# Patient Record
Sex: FEMALE | Race: WHITE | NOT HISPANIC OR LATINO | Employment: UNEMPLOYED | URBAN - METROPOLITAN AREA
[De-identification: names, ages, dates, MRNs, and addresses within clinical notes are randomized per-mention and may not be internally consistent; named-entity substitution may affect disease eponyms.]

---

## 2023-04-26 ENCOUNTER — TRANSCRIBE ORDERS (OUTPATIENT)
Dept: ADMINISTRATIVE | Facility: HOSPITAL | Age: 53
End: 2023-04-26
Payer: COMMERCIAL

## 2023-04-26 DIAGNOSIS — Z12.31 ENCOUNTER FOR SCREENING MAMMOGRAM FOR MALIGNANT NEOPLASM OF BREAST: Primary | ICD-10-CM

## 2023-04-30 ENCOUNTER — APPOINTMENT (OUTPATIENT)
Dept: CT IMAGING | Facility: HOSPITAL | Age: 53
End: 2023-04-30
Payer: COMMERCIAL

## 2023-04-30 ENCOUNTER — APPOINTMENT (OUTPATIENT)
Dept: GENERAL RADIOLOGY | Facility: HOSPITAL | Age: 53
End: 2023-04-30
Payer: COMMERCIAL

## 2023-04-30 ENCOUNTER — HOSPITAL ENCOUNTER (EMERGENCY)
Facility: HOSPITAL | Age: 53
Discharge: HOME | End: 2023-04-30
Attending: EMERGENCY MEDICINE | Admitting: EMERGENCY MEDICINE
Payer: COMMERCIAL

## 2023-04-30 VITALS
SYSTOLIC BLOOD PRESSURE: 155 MMHG | BODY MASS INDEX: 34.22 KG/M2 | RESPIRATION RATE: 17 BRPM | WEIGHT: 218.03 LBS | HEIGHT: 67 IN | TEMPERATURE: 98 F | DIASTOLIC BLOOD PRESSURE: 82 MMHG | HEART RATE: 74 BPM | OXYGEN SATURATION: 97 %

## 2023-04-30 DIAGNOSIS — F41.0 PANIC: ICD-10-CM

## 2023-04-30 DIAGNOSIS — R20.2 PARESTHESIA: Primary | ICD-10-CM

## 2023-04-30 LAB
ALBUMIN SERPL-MCNC: 4.6 G/DL (ref 3.5–5.2)
ALBUMIN/GLOB SERPL: 1.4 G/DL
ALP SERPL-CCNC: 73 U/L (ref 39–117)
ALT SERPL W P-5'-P-CCNC: 39 U/L (ref 1–33)
ANION GAP SERPL CALCULATED.3IONS-SCNC: 9.6 MMOL/L (ref 5–15)
AST SERPL-CCNC: 24 U/L (ref 1–32)
BASOPHILS # BLD AUTO: 0.07 10*3/MM3 (ref 0–0.2)
BASOPHILS NFR BLD AUTO: 0.8 % (ref 0–1.5)
BILIRUB SERPL-MCNC: 0.2 MG/DL (ref 0–1.2)
BUN SERPL-MCNC: 11 MG/DL (ref 6–20)
BUN/CREAT SERPL: 10.9 (ref 7–25)
CALCIUM SPEC-SCNC: 9.8 MG/DL (ref 8.6–10.5)
CHLORIDE SERPL-SCNC: 102 MMOL/L (ref 98–107)
CO2 SERPL-SCNC: 26.4 MMOL/L (ref 22–29)
CREAT SERPL-MCNC: 1.01 MG/DL (ref 0.57–1)
DEPRECATED RDW RBC AUTO: 39.2 FL (ref 37–54)
EGFRCR SERPLBLD CKD-EPI 2021: 66.7 ML/MIN/1.73
EOSINOPHIL # BLD AUTO: 0.17 10*3/MM3 (ref 0–0.4)
EOSINOPHIL NFR BLD AUTO: 1.8 % (ref 0.3–6.2)
ERYTHROCYTE [DISTWIDTH] IN BLOOD BY AUTOMATED COUNT: 12.1 % (ref 12.3–15.4)
GLOBULIN UR ELPH-MCNC: 3.2 GM/DL
GLUCOSE SERPL-MCNC: 108 MG/DL (ref 65–99)
HCT VFR BLD AUTO: 41 % (ref 34–46.6)
HGB BLD-MCNC: 13.9 G/DL (ref 12–15.9)
HOLD SPECIMEN: NORMAL
HOLD SPECIMEN: NORMAL
IMM GRANULOCYTES # BLD AUTO: 0.02 10*3/MM3 (ref 0–0.05)
IMM GRANULOCYTES NFR BLD AUTO: 0.2 % (ref 0–0.5)
LYMPHOCYTES # BLD AUTO: 4.16 10*3/MM3 (ref 0.7–3.1)
LYMPHOCYTES NFR BLD AUTO: 44.6 % (ref 19.6–45.3)
MAGNESIUM SERPL-MCNC: 2.1 MG/DL (ref 1.6–2.6)
MCH RBC QN AUTO: 30.2 PG (ref 26.6–33)
MCHC RBC AUTO-ENTMCNC: 33.9 G/DL (ref 31.5–35.7)
MCV RBC AUTO: 89.1 FL (ref 79–97)
MONOCYTES # BLD AUTO: 0.64 10*3/MM3 (ref 0.1–0.9)
MONOCYTES NFR BLD AUTO: 6.9 % (ref 5–12)
NEUTROPHILS NFR BLD AUTO: 4.27 10*3/MM3 (ref 1.7–7)
NEUTROPHILS NFR BLD AUTO: 45.7 % (ref 42.7–76)
NRBC BLD AUTO-RTO: 0 /100 WBC (ref 0–0.2)
PLATELET # BLD AUTO: 258 10*3/MM3 (ref 140–450)
PMV BLD AUTO: 8.8 FL (ref 6–12)
POTASSIUM SERPL-SCNC: 4.2 MMOL/L (ref 3.5–5.2)
PROT SERPL-MCNC: 7.8 G/DL (ref 6–8.5)
RBC # BLD AUTO: 4.6 10*6/MM3 (ref 3.77–5.28)
SODIUM SERPL-SCNC: 138 MMOL/L (ref 136–145)
TROPONIN T SERPL HS-MCNC: <6 NG/L
WBC NRBC COR # BLD: 9.33 10*3/MM3 (ref 3.4–10.8)
WHOLE BLOOD HOLD COAG: NORMAL
WHOLE BLOOD HOLD SPECIMEN: NORMAL

## 2023-04-30 PROCEDURE — 99283 EMERGENCY DEPT VISIT LOW MDM: CPT

## 2023-04-30 PROCEDURE — 93005 ELECTROCARDIOGRAM TRACING: CPT

## 2023-04-30 PROCEDURE — 93005 ELECTROCARDIOGRAM TRACING: CPT | Performed by: EMERGENCY MEDICINE

## 2023-04-30 PROCEDURE — 99222 1ST HOSP IP/OBS MODERATE 55: CPT | Performed by: PSYCHIATRY & NEUROLOGY

## 2023-04-30 PROCEDURE — 70450 CT HEAD/BRAIN W/O DYE: CPT

## 2023-04-30 PROCEDURE — 84484 ASSAY OF TROPONIN QUANT: CPT

## 2023-04-30 PROCEDURE — 85025 COMPLETE CBC W/AUTO DIFF WBC: CPT

## 2023-04-30 PROCEDURE — 71045 X-RAY EXAM CHEST 1 VIEW: CPT

## 2023-04-30 PROCEDURE — 36415 COLL VENOUS BLD VENIPUNCTURE: CPT

## 2023-04-30 PROCEDURE — 83735 ASSAY OF MAGNESIUM: CPT

## 2023-04-30 PROCEDURE — 80053 COMPREHEN METABOLIC PANEL: CPT

## 2023-04-30 RX ORDER — SODIUM CHLORIDE 0.9 % (FLUSH) 0.9 %
10 SYRINGE (ML) INJECTION AS NEEDED
Status: DISCONTINUED | OUTPATIENT
Start: 2023-04-30 | End: 2023-04-30 | Stop reason: HOSPADM

## 2023-04-30 RX ORDER — PAROXETINE 10 MG/1
1 TABLET, FILM COATED ORAL DAILY
COMMUNITY
Start: 2023-04-20

## 2023-04-30 RX ORDER — LEVOTHYROXINE SODIUM 0.07 MG/1
75 TABLET ORAL EVERY MORNING
COMMUNITY
Start: 2023-04-20

## 2023-04-30 RX ORDER — LORATADINE 10 MG/1
CAPSULE, LIQUID FILLED ORAL
COMMUNITY

## 2023-04-30 NOTE — ED PROVIDER NOTES
"Time: 1:57 AM EDT  Date of encounter:  4/30/2023  Independent Historian/Clinical History and Information was obtained by:   Patient and Family  Chief Complaint: difficulty swallowing, paresthesias    History is limited by: N/A    History of Present Illness:  Patient is a 53 y.o. year old female who presents to the emergency department for evaluation of difficulty swallowing and paresthesias. Patient states she went to bed around 2200 last night and woke up unable to swallow. She states she tried to drink some water but felt like the water went down her throat incorrectly. She states she has been able to drink water since arrival. She also states \"nothing would move fast\" at that time. She describes this as slowness in movement of her hands and walking slower than normal. She then developed some paresthesias in her arms. She denies unilateral paresthesias. She has not experienced similar symptoms previously. She states she feels better now.     states at home prior to arrival the patient had some difficulty articulating her speech. Patient states she knew what she wanted to say but was unable to say it. Patient states this has improved since arrival.    Patient has no hx of HTN, but she was recently seen by her PCP and her BP was high at that time.    HPI    Patient Care Team  Primary Care Provider: Provider, Devi Known    Past Medical History:     No Known Allergies  History reviewed. No pertinent past medical history.  History reviewed. No pertinent surgical history.  History reviewed. No pertinent family history.    Home Medications:  Prior to Admission medications    Medication Sig Start Date End Date Taking? Authorizing Provider   levothyroxine (SYNTHROID, LEVOTHROID) 75 MCG tablet Take 1 tablet by mouth Every Morning. 4/20/23   ProviderMoody MD   Loratadine (Claritin) 10 MG capsule Take  by mouth.    ProviderMoody MD   PARoxetine (PAXIL) 10 MG tablet Take 1 tablet by mouth Daily. 4/20/23   " "Provider, Historical, MD        Social History:   Social History     Tobacco Use   • Smoking status: Never   Vaping Use   • Vaping Use: Every day   Substance Use Topics   • Alcohol use: Never         Review of Systems:  Review of Systems   Constitutional: Negative for chills and fever.   HENT: Positive for trouble swallowing. Negative for congestion, ear pain and sore throat.    Eyes: Negative for pain.   Respiratory: Negative for cough, chest tightness and shortness of breath.    Cardiovascular: Negative for chest pain.   Gastrointestinal: Negative for abdominal pain, diarrhea, nausea and vomiting.   Genitourinary: Negative for flank pain and hematuria.   Musculoskeletal: Negative for joint swelling.   Skin: Negative for pallor.   Neurological: Positive for speech difficulty. Negative for seizures and headaches.        Positive for paresthesias in BL upper extremities, Negative for unilateral paresthesias, Positive for bradykinesia   All other systems reviewed and are negative.       Physical Exam:  /82   Pulse 74   Temp 98 °F (36.7 °C) (Oral)   Resp 17   Ht 170.2 cm (67\")   Wt 98.9 kg (218 lb 0.6 oz)   SpO2 97%   BMI 34.15 kg/m²     Physical Exam  Vitals and nursing note reviewed.   Constitutional:       General: She is not in acute distress.     Appearance: Normal appearance. She is not toxic-appearing.   HENT:      Head: Normocephalic and atraumatic.      Mouth/Throat:      Mouth: Mucous membranes are moist.   Eyes:      General: No scleral icterus.  Cardiovascular:      Rate and Rhythm: Normal rate and regular rhythm.      Pulses: Normal pulses.      Heart sounds: Normal heart sounds.   Pulmonary:      Effort: Pulmonary effort is normal. No respiratory distress.      Breath sounds: Normal breath sounds.   Abdominal:      General: Abdomen is flat.      Palpations: Abdomen is soft.      Tenderness: There is no abdominal tenderness.   Musculoskeletal:         General: Normal range of motion.      " Cervical back: Normal range of motion and neck supple.   Skin:     General: Skin is warm and dry.   Neurological:      Mental Status: She is alert and oriented to person, place, and time. Mental status is at baseline.      Comments: NIHSS score of 0                  Procedures:  Procedures      Medical Decision Making:      Comorbidities that affect care:    None    External Notes reviewed:    None      The following orders were placed and all results were independently analyzed by me:  Orders Placed This Encounter   Procedures   • XR Chest 1 View   • CT Head Without Contrast   • [place] Draw   • Comprehensive Metabolic Panel   • Single High Sensitivity Troponin T   • Magnesium   • CBC Auto Differential   • Undress & Gown   • Continuous Pulse Oximetry   • Vital Signs   • ECG 12 Lead ED Triage Standing Order; Weak / Dizzy / AMS   • CBC & Differential   • Green Top (Gel)   • Lavender Top   • Gold Top - SST   • Light Blue Top       Medications Given in the Emergency Department:  Medications - No data to display     ED Course:    ED Course as of 04/30/23 0828   Sun Apr 30, 2023   0156 My interpretation of EKG: Sinus rhythm 85, no acute ischemia, no comparison available. [JS]      ED Course User Index  [JS] Samy Cdaena MD       Labs:    Lab Results (last 24 hours)     Procedure Component Value Units Date/Time    CBC & Differential [[id number]]  (Abnormal) Collected: 04/30/23 0059    Specimen: Blood Updated: 04/30/23 0104    Narrative:      The following orders were created for panel order CBC & Differential.  Procedure                               Abnormality         Status                     ---------                               -----------         ------                     CBC Auto Differential[[id number]]        Abnormal            Final result                 Please view results for these tests on the individual orders.    Comprehensive Metabolic Panel [[id number]]  (Abnormal) Collected: 04/30/23 0059     Specimen: Blood Updated: 04/30/23 0125     Glucose 108 mg/dL      BUN 11 mg/dL      Creatinine 1.01 mg/dL      Sodium 138 mmol/L      Potassium 4.2 mmol/L      Comment: Slight hemolysis detected by analyzer. Results may be affected.        Chloride 102 mmol/L      CO2 26.4 mmol/L      Calcium 9.8 mg/dL      Total Protein 7.8 g/dL      Albumin 4.6 g/dL      ALT (SGPT) 39 U/L      AST (SGOT) 24 U/L      Alkaline Phosphatase 73 U/L      Total Bilirubin 0.2 mg/dL      Globulin 3.2 gm/dL      A/G Ratio 1.4 g/dL      BUN/Creatinine Ratio 10.9     Anion Gap 9.6 mmol/L      eGFR 66.7 mL/min/1.73     Narrative:      GFR Normal >60  Chronic Kidney Disease <60  Kidney Failure <15      Single High Sensitivity Troponin T [[id number]]  (Normal) Collected: 04/30/23 0059    Specimen: Blood Updated: 04/30/23 0125     HS Troponin T <6 ng/L     Narrative:      High Sensitive Troponin T Reference Range:  <10.0 ng/L- Negative Female for AMI  <15.0 ng/L- Negative Male for AMI  >=10 - Abnormal Female indicating possible myocardial injury.  >=15 - Abnormal Male indicating possible myocardial injury.   Clinicians would have to utilize clinical acumen, EKG, Troponin, and serial changes to determine if it is an Acute Myocardial Infarction or myocardial injury due to an underlying chronic condition.         Magnesium [[id number]]  (Normal) Collected: 04/30/23 0059    Specimen: Blood Updated: 04/30/23 0125     Magnesium 2.1 mg/dL     CBC Auto Differential [[id number]]  (Abnormal) Collected: 04/30/23 0059    Specimen: Blood Updated: 04/30/23 0104     WBC 9.33 10*3/mm3      RBC 4.60 10*6/mm3      Hemoglobin 13.9 g/dL      Hematocrit 41.0 %      MCV 89.1 fL      MCH 30.2 pg      MCHC 33.9 g/dL      RDW 12.1 %      RDW-SD 39.2 fl      MPV 8.8 fL      Platelets 258 10*3/mm3      Neutrophil % 45.7 %      Lymphocyte % 44.6 %      Monocyte % 6.9 %      Eosinophil % 1.8 %      Basophil % 0.8 %      Immature Grans % 0.2 %      Neutrophils, Absolute 4.27  10*3/mm3      Lymphocytes, Absolute 4.16 10*3/mm3      Monocytes, Absolute 0.64 10*3/mm3      Eosinophils, Absolute 0.17 10*3/mm3      Basophils, Absolute 0.07 10*3/mm3      Immature Grans, Absolute 0.02 10*3/mm3      nRBC 0.0 /100 WBC            Imaging:    CT Head Without Contrast    Result Date: 4/30/2023  PROCEDURE: CT HEAD WO CONTRAST  COMPARISON: None.  INDICATIONS: trouble swallowing; paresthesias  PROTOCOL:   Standard CT imaging protocol performed    RADIATION:   Total DLP: 1,018.2 mGy*cm   MA and/or KV were/was adjusted to minimize radiation dose.    TECHNIQUE: After obtaining the patient's consent, 130 CT images were obtained without non-ionic intravenous contrast material.  DISCUSSION:  A routine nonenhanced head CT was performed. No acute brain abnormality is identified. No acute intracranial hemorrhage. No acute infarction. No acute skull fracture. No midline shift or acute intracranial mass effect is seen.  Mild-to-moderate chronic small vessel ischemia/infarction is suspected. There are arterial calcifications. The extra-axial spaces and the ventricular system are mildly prominent.  Focal mineralization or ossification is seen along the anterior interhemispheric parafalcine region, measuring about 9 mm, as on image 45 of series 202.  It may be related to hyperostosis frontalis interna.  A mineralized or ossified meningioma may give a similar appearance (but is probably less likely).  A similar but smaller finding is seen on image 31 of series 202 in a similar location, measuring about 7 mm.  There may be other similar but smaller findings.  These findings are likely of doubtful clinical significance.  Mild-to-moderate age-indeterminate mucosal thickening involves the imaged paranasal sinuses.  No air-fluid interfaces are seen within the imaged paranasal sinuses.  No significant acute findings are seen with regard to the imaged orbits or middle ear clefts.        No acute brain abnormality is seen.  No  acute infarct.  No acute intracranial hemorrhage.  Please see above comments for further detail.    Please note that portions of this note were completed with a voice recognition program.  SCOTT BRIDGES JR, MD       Electronically Signed and Approved By: SCOTT BRIDGES JR, MD on 4/30/2023 at 2:54             XR Chest 1 View    Result Date: 4/30/2023  PROCEDURE: XR CHEST 1 VW  COMPARISON: None.  INDICATIONS: WEAK/DIZZY/AMS TODAY.  FINDINGS: A single frontal (AP or PA upright portable) chest radiograph reveals no cardiac enlargement and no acute infiltrate. No pneumothorax is seen.       No acute cardiopulmonary disease is seen radiographically.     Please note that portions of this note were completed with a voice recognition program.  SCOTT BRIDGES JR, MD       Electronically Signed and Approved By: SCOTT BRIDGES JR, MD on 4/30/2023 at 1:52                  Differential Diagnosis and Discussion:    Paresthesia: Differential diagnosis includes but is not limited to acute stroke, electrolyte imbalance, anxiety, radiculopathy, autoimmune disorders, and endocrine disorders.    All labs were reviewed and interpreted by me.  All X-rays impressions were independently interpreted by me.  EKG was interpreted by me.  CT scan radiology impression was interpreted by me.    [facility]         Patient Care Considerations:    MRI: I considered ordering an MRI however Patient has no neurologic deficits at this time.      Consultants/Shared Management Plan:    Consultant: I have discussed the case with Teleneurology who agrees to consult on the patient.    Social Determinants of Health:    Patient is independent, reliable, and has access to care.       Disposition and Care Coordination:    Discharged: The patient is suitable and stable for discharge with no need for consideration of observation or admission.    I have explained the patient´s condition, diagnoses and treatment plan based on the information available to me at this time. I  have answered questions and addressed any concerns. The patient has a good  understanding of the patient´s diagnosis, condition, and treatment plan as can be expected at this point. The vital signs have been stable. The patient´s condition is stable and appropriate for discharge from the emergency department.      The patient will pursue further outpatient evaluation with the primary care physician or other designated or consulting physician as outlined in the discharge instructions. They are agreeable to this plan of care and follow-up instructions have been explained in detail. The patient has received these instructions in written format and have expressed an understanding of the discharge instructions. The patient is aware that any significant change in condition or worsening of symptoms should prompt an immediate return to this or the closest emergency department or call to 911.  I have explained discharge medications and the need for follow up with the patient/caretakers. This was also printed in the discharge instructions. Patient was discharged with the following medications and follow up:      Medication List      No changes were made to your prescriptions during this visit.      Provider, No Known  [facility] [address]    Schedule an appointment as soon as possible for a visit          Final diagnoses:   Paresthesia   Panic        ED Disposition     ED Disposition   Discharge    Condition   Stable    Comment   --             This medical record created using voice recognition software.    Documentation assistance provided by Christofer Kaye acting as scribe for Samy Cadena MD. Information recorded by the scribe was done at my direction and has been verified and validated by me.       Christofer Kaey  04/30/23 0214       Samy Cadena MD  04/30/23 3717

## 2023-04-30 NOTE — CONSULTS
[facility]  [facility]      Patient Name:   Rosie Silva  YOB: 1970  Identification Number:   MRN - [id number]  Date of Service:   04/30/2023 03:35:05    Diagnosis:      •  R20.2 - Paresthesia of skin    Impression:      • 52 y/o woman presenting with episode of awakening from sleep with bilateral paresthesias and feeling unable to swallow/breath/speak well, now fully resolved. This presentation is not consistent with stroke/TIA, unclear cause but potentially some kind of sleep paralysis which can be associated with SSRIs. No indication for inpatient work-up. I discussed with the patient that if this continues to happen out of sleep, she should be referred for a sleep study.    Sign Out:      •  Discussed with Emergency Department Provider        ------------------------------------------------------------------------------    Advanced Imaging:  Advanced Imaging Deferred because:    Stroke not suspected with clinical presentation and exam      Metrics:  Last Known Well: 04/29/2023 22:00:00  [facility] Notification Time: 04/30/2023 03:33:44  Arrival Time: 04/30/2023 00:35:00  Stamp Time: 04/30/2023 03:35:05  Initial Response Time: 04/30/2023 03:39:10  Symptoms: paresthesias, dysphagia.  NIHSS Start Assessment Time: 04/30/2023 03:49:52  Patient is not a candidate for Thrombolytic.  Thrombolytic Medical Decision: 04/30/2023 03:49:27  Patient was not deemed candidate for Thrombolytic because of following reasons:  Last Well Known Above 4.5 Hours.  Resolved symptoms (no residual disabling symptoms).  Other Diagnosis suspected.    CT head showed no acute hemorrhage or acute core infarct.    Primary Provider Notified of Diagnostic Impression and Management Plan on: 04/30/2023 04:01:36        ------------------------------------------------------------------------------    History of Present Illness:  Patient is a 53 year old Female.    Patient was brought by  private transportation with symptoms of paresthesias, dysphagia.  Patient presents for symptoms of difficulty swallowing and bilateral paresthesias. Felt normal at bedtime around 22:00, woke up with these symptoms, felt like she was having trouble breathing and swallowing her saliva and getting words out, felt slow overall. Symptoms lasted 15-20 minutes, now feeling back to baseline. Never had these symptoms before, can't identify any triggers except that she was not in her own bed so had been tossing and turning.       Past Medical History:  none significant    Medications:    No Anticoagulant use   No Antiplatelet use  Reviewed EMR for current medications    Allergies:   Reviewed    Social History:  Smoking: vaping      Past Surgical History:  There Is No Surgical History Contributory To Today’s Visit         Examination:  BP(155/82), Pulse(74),  1A: Level of Consciousness - Alert; keenly responsive + 0  1B: Ask Month and Age - Both Questions Right + 0  1C: Blink Eyes & Squeeze Hands - Performs Both Tasks + 0  2: Test Horizontal Extraocular Movements - Normal + 0  3: Test Visual Fields - No Visual Loss + 0  4: Test Facial Palsy (Use Grimace if Obtunded) - Normal symmetry + 0  5A: Test Left Arm Motor Drift - No Drift for 10 Seconds + 0  5B: Test Right Arm Motor Drift - No Drift for 10 Seconds + 0  6A: Test Left Leg Motor Drift - No Drift for 5 Seconds + 0  6B: Test Right Leg Motor Drift - No Drift for 5 Seconds + 0  7: Test Limb Ataxia (FNF/Heel-Shin) - No Ataxia + 0  8: Test Sensation - Normal; No sensory loss + 0  9: Test Language/Aphasia - Normal; No aphasia + 0  10: Test Dysarthria - Normal + 0  11: Test Extinction/Inattention - No abnormality + 0    NIHSS Score: 0      Pre-Morbid Modified [place] Scale:  0 Points = No symptoms at all    I reviewed the available imaging via A.I. software Rapid and initiated discussion with the primary provider    Patient/Family was informed the Neurology Consult would occur via  TeleHealth consult by way of interactive audio and video telecommunications and consented to receiving care in this manner.      Patient is being evaluated for possible acute neurologic impairment and high probability of imminent or life-threatening deterioration. I spent total of 18 minutes providing care to this patient, including time for face to face visit via telemedicine, review of medical records, imaging studies and discussion of findings with providers, the patient and/or family.      Dr Denia Mills      [facility]  [phone]      Case [id number]

## 2023-05-05 LAB — QT INTERVAL: 390 MS

## 2023-11-08 ENCOUNTER — TRANSCRIBE ORDERS (OUTPATIENT)
Dept: ADMINISTRATIVE | Facility: HOSPITAL | Age: 53
End: 2023-11-08
Payer: COMMERCIAL

## 2023-11-08 DIAGNOSIS — N95.0 POSTMENOPAUSAL BLEEDING: Primary | ICD-10-CM

## 2023-11-08 DIAGNOSIS — Z12.31 VISIT FOR SCREENING MAMMOGRAM: Primary | ICD-10-CM

## 2023-11-14 ENCOUNTER — HOSPITAL ENCOUNTER (OUTPATIENT)
Dept: ULTRASOUND IMAGING | Facility: HOSPITAL | Age: 53
Discharge: HOME | End: 2023-11-14
Admitting: FAMILY MEDICINE
Payer: COMMERCIAL

## 2023-11-14 DIAGNOSIS — N95.0 POSTMENOPAUSAL BLEEDING: ICD-10-CM

## 2023-11-14 PROCEDURE — 76856 US EXAM PELVIC COMPLETE: CPT

## 2023-11-28 PROCEDURE — 87635 SARS-COV-2 COVID-19 AMP PRB: CPT | Performed by: EMERGENCY MEDICINE

## 2023-12-12 ENCOUNTER — HOSPITAL ENCOUNTER (OUTPATIENT)
Dept: MAMMOGRAPHY | Facility: HOSPITAL | Age: 53
Discharge: HOME | End: 2023-12-12
Admitting: FAMILY MEDICINE
Payer: COMMERCIAL

## 2023-12-12 DIAGNOSIS — Z12.31 VISIT FOR SCREENING MAMMOGRAM: ICD-10-CM

## 2023-12-12 PROCEDURE — 77063 BREAST TOMOSYNTHESIS BI: CPT

## 2023-12-12 PROCEDURE — 77067 SCR MAMMO BI INCL CAD: CPT

## 2023-12-19 NOTE — PROGRESS NOTES
GYN Visit    No chief complaint on file.      HPI:   53 y.o.No obstetric history on file. Contraception or HRT: {APCONTRACEPTIONHRT:44364}    Menses:   q *** days, lasts *** days, changes products q ***hrs on heaviest days.   Pain:  {APPAIN:16381}    ***      History: PMHx, Meds, Allergies, PSHx, Social Hx, and POBHx all reviewed and updated.    PHYSICAL EXAM:  There were no vitals taken for this visit.  General- NAD, alert and oriented, appropriate  Psych- Normal mood, good memory    Neck- No masses, no thyroid enlargement  CV- Regular rhythm, no murnurs  Resp- CTA to bases, no wheezes  Abdomen- Soft, non distended, non tender, no masses    Breast left-  Bilaterally symmetrical, no masses, non tender, no nipple discharge  Breast right- Bilaterally symmetrical, no masses, non tender, no nipple discharge    External genitalia- Normal female, no lesions  Urethra/meatus- Normal, no masses, non tender, no prolapse  Bladder- Normal, no masses, non tender, no prolapse  Vagina- Normal, no atrophy, no lesions, no discharge, no prolapse  Cvx- {APCVX:87592}  Uterus- {APUTERUS:87844}  Adnexa- {APADNEXA:77852}  Anus/Rectum/Perineum- {APRECTAL:72823}    Lymphatic- No palpable neck, axillary, or groin nodes  Ext- No edema, no cyanosis    Skin- No lesions, no rashes, no acanthosis nigricans  {APNEXPLANON (Optional):92034}      US Pelvis Complete (11/14/2023 10:00)    ASSESSMENT AND PLAN:  There are no diagnoses linked to this encounter.  {APGYNCOUNSELING (Optional):57977}  {Ectopic/MTX Treatment/FU/Plan (Optional):20552}  {Referral/Consult (Optional):77187}  Follow Up:  No follow-ups on file.    {Time Spent-Use for E/M Coding (Optional):19926}      Jeffery Richard MA  12/21/2023    [facility]  [facility] [place] [address]  Dept: [phone]  Dept Fax: [phone]  Loc: [phone]  Loc Fax: [phone]

## 2023-12-21 ENCOUNTER — OFFICE VISIT (OUTPATIENT)
Dept: OBSTETRICS AND GYNECOLOGY | Facility: CLINIC | Age: 53
End: 2023-12-21
Payer: COMMERCIAL

## 2023-12-21 VITALS
BODY MASS INDEX: 32.89 KG/M2 | HEART RATE: 76 BPM | SYSTOLIC BLOOD PRESSURE: 121 MMHG | WEIGHT: 217 LBS | DIASTOLIC BLOOD PRESSURE: 82 MMHG | HEIGHT: 68 IN

## 2023-12-21 DIAGNOSIS — Z01.419 WELL WOMAN EXAM WITH ROUTINE GYNECOLOGICAL EXAM: Primary | ICD-10-CM

## 2023-12-21 PROCEDURE — 87624 HPV HI-RISK TYP POOLED RSLT: CPT

## 2023-12-21 PROCEDURE — G0123 SCREEN CERV/VAG THIN LAYER: HCPCS

## 2023-12-21 PROCEDURE — 99386 PREV VISIT NEW AGE 40-64: CPT | Performed by: OBSTETRICS & GYNECOLOGY

## 2023-12-21 NOTE — PROGRESS NOTES
"Well Woman Visit    CC: Scheduled annual well gyn visit  Chief Complaint   Patient presents with    Gynecologic Exam       HPI:   53 y.o.   Social History     Substance and Sexual Activity   Sexual Activity Defer    Birth control/protection: Post-menopausal, Tubal ligation       52 y/o  with LMP 10/23 here for pap smear.  Pt states previous period . Pt has not gone more than 10to 11 months without having some kind of bleeding. +night sweats + hot flashes. Last pap 4-5 years ago-neg per patient.  ?hx abnormal pap 20 years ago- had repeat and negative. Last colonoscopy - needs f/u 2024.  Had mammogram last week.  Pt states had breast exam just desires pap today.  Pt has no complaints today.    PCP: does manage PMHx and preventative labs  History: PMHx, Meds, Allergies, PSHx, Social Hx, and POBHx all reviewed and updated.    PHYSICAL EXAM:  /82   Pulse 76   Ht 172.7 cm (68\")   Wt 98.4 kg (217 lb)   Breastfeeding No   BMI 32.99 kg/m²  Not found.  General- NAD, alert and oriented, appropriate  Psych- Normal mood, good memory  Neck- No masses, no thyroid enlargement  CV- Regular rhythm, no murmurs  Resp- CTA to bases, no wheezes  Abdomen- Soft, non distended, non tender, no masses        External genitalia- Normal female, no lesions  Urethra/meatus- Normal, no masses, non tender  Bladder- Normal, no masses, non tender  Vagina- Normal, no atrophy, no lesions, no discharge.      Cvx- Normal, no lesions, no discharge, No cervical motion tenderness, ectropion, + nabothian cysts  Uterus- Normal size, shape & consistency.  Non tender, mobile, & no prolapse  Adnexa- No mass, non tender  Anus/Rectum/Perineum- Normal appearance, no mass, good sphincter tone, no hemorrhoids, no prolapse, Hemoccult NEGATIVE    Lymphatic- No palpable neck, axillary, or groin nodes  Ext- No edema, no cyanosis    Skin- No lesions, no rashes, no acanthosis nigricans      US Pelvis Complete (2023 " 10:00)      ASSESSMENT and PLAN:    Diagnoses and all orders for this visit:    1. Well woman exam with routine gynecological exam (Primary)  -     IgP, Aptima HPV        Preventative:  BREAST HEALTH- Monthly self breast exam importance and how to reviewed. MMG and/or MRI (prn) reviewed per society guidelines and her individual history. Screen: Already up to date  CERVICAL CANCER Screening- Reviewed current ASCCP guidelines for screening w and wo cotest HPV, age specific.  Screen: Updated today  COLON CANCER Screening- Reviewed current medical society guidelines and options.  Screen:  Already up to date  Follow up PCP/Specialist PMHx and/or Labs          She understands the importance of having any ordered tests to be performed in a timely fashion.  The risks of not performing them include, but are not limited to, advanced cancer stages, bone loss from osteoporosis and/or subsequent increase in morbidity and/or mortality.  She is encouraged to review her results online and/or contact or office if she has questions.     Follow Up:  Return in about 1 year (around 12/21/2024) for Annual physical.            Aimee Ambriz,   12/21/2023    [facility] [place] [facility]  [address] [place] [address]  Dept: [phone]  Dept Fax: [phone]  Loc: [phone]  Loc Fax: [phone]

## 2023-12-28 LAB
CYTOLOGIST CVX/VAG CYTO: NORMAL
CYTOLOGY CVX/VAG DOC CYTO: NORMAL
CYTOLOGY CVX/VAG DOC THIN PREP: NORMAL
DX ICD CODE: NORMAL
HIV 1 & 2 AB SER-IMP: NORMAL
HPV I/H RISK 4 DNA CVX QL PROBE+SIG AMP: NEGATIVE
OTHER STN SPEC: NORMAL
STAT OF ADQ CVX/VAG CYTO-IMP: NORMAL

## 2024-05-29 ENCOUNTER — HOSPITAL ENCOUNTER (EMERGENCY)
Facility: HOSPITAL | Age: 54
Discharge: HOME | End: 2024-05-29
Attending: EMERGENCY MEDICINE | Admitting: EMERGENCY MEDICINE
Payer: COMMERCIAL

## 2024-05-29 VITALS
RESPIRATION RATE: 16 BRPM | HEART RATE: 84 BPM | SYSTOLIC BLOOD PRESSURE: 134 MMHG | TEMPERATURE: 98.1 F | DIASTOLIC BLOOD PRESSURE: 76 MMHG | OXYGEN SATURATION: 97 %

## 2024-05-29 DIAGNOSIS — R31.9 URINARY TRACT INFECTION WITH HEMATURIA, SITE UNSPECIFIED: Primary | ICD-10-CM

## 2024-05-29 DIAGNOSIS — N39.0 URINARY TRACT INFECTION WITH HEMATURIA, SITE UNSPECIFIED: Primary | ICD-10-CM

## 2024-05-29 LAB
ALBUMIN SERPL-MCNC: 4.7 G/DL (ref 3.5–5.2)
ALBUMIN/GLOB SERPL: 1.5 G/DL
ALP SERPL-CCNC: 72 U/L (ref 39–117)
ALT SERPL W P-5'-P-CCNC: 39 U/L (ref 1–33)
ANION GAP SERPL CALCULATED.3IONS-SCNC: 11 MMOL/L (ref 5–15)
AST SERPL-CCNC: 23 U/L (ref 1–32)
BACTERIA UR QL AUTO: ABNORMAL /HPF
BASOPHILS # BLD AUTO: 0.08 10*3/MM3 (ref 0–0.2)
BASOPHILS NFR BLD AUTO: 0.6 % (ref 0–1.5)
BILIRUB SERPL-MCNC: 0.4 MG/DL (ref 0–1.2)
BILIRUB UR QL STRIP: NEGATIVE
BUN SERPL-MCNC: 12 MG/DL (ref 6–20)
BUN/CREAT SERPL: 12.2 (ref 7–25)
CALCIUM SPEC-SCNC: 9.9 MG/DL (ref 8.6–10.5)
CHLORIDE SERPL-SCNC: 100 MMOL/L (ref 98–107)
CLARITY UR: ABNORMAL
CO2 SERPL-SCNC: 25 MMOL/L (ref 22–29)
COLOR UR: YELLOW
CREAT SERPL-MCNC: 0.98 MG/DL (ref 0.57–1)
D-LACTATE SERPL-SCNC: 1.5 MMOL/L (ref 0.5–2)
DEPRECATED RDW RBC AUTO: 40.8 FL (ref 37–54)
EGFRCR SERPLBLD CKD-EPI 2021: 68.7 ML/MIN/1.73
EOSINOPHIL # BLD AUTO: 0.15 10*3/MM3 (ref 0–0.4)
EOSINOPHIL NFR BLD AUTO: 1.1 % (ref 0.3–6.2)
ERYTHROCYTE [DISTWIDTH] IN BLOOD BY AUTOMATED COUNT: 12.3 % (ref 12.3–15.4)
GLOBULIN UR ELPH-MCNC: 3.1 GM/DL
GLUCOSE SERPL-MCNC: 134 MG/DL (ref 65–99)
GLUCOSE UR STRIP-MCNC: NEGATIVE MG/DL
HCT VFR BLD AUTO: 42 % (ref 34–46.6)
HGB BLD-MCNC: 14.1 G/DL (ref 12–15.9)
HGB UR QL STRIP.AUTO: ABNORMAL
HOLD SPECIMEN: NORMAL
HOLD SPECIMEN: NORMAL
HYALINE CASTS UR QL AUTO: ABNORMAL /LPF
IMM GRANULOCYTES # BLD AUTO: 0.04 10*3/MM3 (ref 0–0.05)
IMM GRANULOCYTES NFR BLD AUTO: 0.3 % (ref 0–0.5)
KETONES UR QL STRIP: NEGATIVE
LEUKOCYTE ESTERASE UR QL STRIP.AUTO: ABNORMAL
LIPASE SERPL-CCNC: 52 U/L (ref 13–60)
LYMPHOCYTES # BLD AUTO: 2.71 10*3/MM3 (ref 0.7–3.1)
LYMPHOCYTES NFR BLD AUTO: 19.1 % (ref 19.6–45.3)
MCH RBC QN AUTO: 30.7 PG (ref 26.6–33)
MCHC RBC AUTO-ENTMCNC: 33.6 G/DL (ref 31.5–35.7)
MCV RBC AUTO: 91.5 FL (ref 79–97)
MONOCYTES # BLD AUTO: 0.83 10*3/MM3 (ref 0.1–0.9)
MONOCYTES NFR BLD AUTO: 5.8 % (ref 5–12)
NEUTROPHILS NFR BLD AUTO: 10.38 10*3/MM3 (ref 1.7–7)
NEUTROPHILS NFR BLD AUTO: 73.1 % (ref 42.7–76)
NITRITE UR QL STRIP: POSITIVE
NRBC BLD AUTO-RTO: 0 /100 WBC (ref 0–0.2)
PH UR STRIP.AUTO: 6 [PH] (ref 5–8)
PLATELET # BLD AUTO: 264 10*3/MM3 (ref 140–450)
PMV BLD AUTO: 9.2 FL (ref 6–12)
POTASSIUM SERPL-SCNC: 4.1 MMOL/L (ref 3.5–5.2)
PROT SERPL-MCNC: 7.8 G/DL (ref 6–8.5)
PROT UR QL STRIP: ABNORMAL
RBC # BLD AUTO: 4.59 10*6/MM3 (ref 3.77–5.28)
RBC # UR STRIP: ABNORMAL /HPF
REF LAB TEST METHOD: ABNORMAL
SODIUM SERPL-SCNC: 136 MMOL/L (ref 136–145)
SP GR UR STRIP: <=1.005 (ref 1–1.03)
SQUAMOUS #/AREA URNS HPF: ABNORMAL /HPF
UROBILINOGEN UR QL STRIP: ABNORMAL
WBC # UR STRIP: ABNORMAL /HPF
WBC NRBC COR # BLD AUTO: 14.19 10*3/MM3 (ref 3.4–10.8)
WHOLE BLOOD HOLD COAG: NORMAL
WHOLE BLOOD HOLD SPECIMEN: NORMAL

## 2024-05-29 PROCEDURE — 83605 ASSAY OF LACTIC ACID: CPT | Performed by: EMERGENCY MEDICINE

## 2024-05-29 PROCEDURE — 87077 CULTURE AEROBIC IDENTIFY: CPT | Performed by: REGISTERED NURSE

## 2024-05-29 PROCEDURE — 96365 THER/PROPH/DIAG IV INF INIT: CPT

## 2024-05-29 PROCEDURE — 87086 URINE CULTURE/COLONY COUNT: CPT | Performed by: REGISTERED NURSE

## 2024-05-29 PROCEDURE — 87186 SC STD MICRODIL/AGAR DIL: CPT | Performed by: REGISTERED NURSE

## 2024-05-29 PROCEDURE — 80053 COMPREHEN METABOLIC PANEL: CPT | Performed by: EMERGENCY MEDICINE

## 2024-05-29 PROCEDURE — 81001 URINALYSIS AUTO W/SCOPE: CPT | Performed by: EMERGENCY MEDICINE

## 2024-05-29 PROCEDURE — 25010000002 CEFTRIAXONE PER 250 MG: Performed by: REGISTERED NURSE

## 2024-05-29 PROCEDURE — 99283 EMERGENCY DEPT VISIT LOW MDM: CPT

## 2024-05-29 PROCEDURE — 85025 COMPLETE CBC W/AUTO DIFF WBC: CPT | Performed by: EMERGENCY MEDICINE

## 2024-05-29 PROCEDURE — 83690 ASSAY OF LIPASE: CPT | Performed by: EMERGENCY MEDICINE

## 2024-05-29 RX ORDER — KETOROLAC TROMETHAMINE 30 MG/ML
30 INJECTION, SOLUTION INTRAMUSCULAR; INTRAVENOUS ONCE
Status: DISCONTINUED | OUTPATIENT
Start: 2024-05-29 | End: 2024-05-29

## 2024-05-29 RX ORDER — ONDANSETRON 4 MG/1
4 TABLET, ORALLY DISINTEGRATING ORAL EVERY 8 HOURS PRN
Qty: 30 TABLET | Refills: 0 | Status: SHIPPED | OUTPATIENT
Start: 2024-05-29 | End: 2024-06-08

## 2024-05-29 RX ORDER — AMOXICILLIN 500 MG/1
CAPSULE ORAL
COMMUNITY
Start: 2024-05-22 | End: 2024-05-29

## 2024-05-29 RX ORDER — PHENAZOPYRIDINE HYDROCHLORIDE 200 MG/1
200 TABLET, FILM COATED ORAL 3 TIMES DAILY PRN
Qty: 6 TABLET | Refills: 0 | Status: SHIPPED | OUTPATIENT
Start: 2024-05-29 | End: 2024-05-31

## 2024-05-29 RX ORDER — PHENAZOPYRIDINE HYDROCHLORIDE 100 MG/1
200 TABLET, FILM COATED ORAL ONCE
Status: COMPLETED | OUTPATIENT
Start: 2024-05-29 | End: 2024-05-29

## 2024-05-29 RX ORDER — SODIUM CHLORIDE 0.9 % (FLUSH) 0.9 %
10 SYRINGE (ML) INJECTION AS NEEDED
Status: DISCONTINUED | OUTPATIENT
Start: 2024-05-29 | End: 2024-05-29 | Stop reason: HOSPADM

## 2024-05-29 RX ORDER — CEPHALEXIN 500 MG/1
500 CAPSULE ORAL 4 TIMES DAILY
Qty: 28 CAPSULE | Refills: 0 | Status: SHIPPED | OUTPATIENT
Start: 2024-05-29 | End: 2024-06-05

## 2024-05-29 RX ADMIN — PHENAZOPYRIDINE 200 MG: 100 TABLET ORAL at 08:29

## 2024-05-29 RX ADMIN — CEFTRIAXONE SODIUM 2000 MG: 2 INJECTION, POWDER, FOR SOLUTION INTRAMUSCULAR; INTRAVENOUS at 09:32

## 2024-05-29 NOTE — ED PROVIDER NOTES
Time: 7:53 AM EDT  Date of encounter:  5/29/2024  Independent Historian/Clinical History and Information was obtained by:   Patient    History is limited by: N/A    Chief Complaint: Hematuria, painful urination      History of Present Illness:  Patient is a 54 y.o. year old female who presents to the emergency department for evaluation of urinary frequency, hematuria and painful urination that started around midnight.  Patient denies nausea/vomiting, fever/chills.  She denies flank pain.  Complains of lower abdominal pain.    HPI    Patient Care Team  Primary Care Provider: Ravi Gutierrez MD    Past Medical History:     No Known Allergies  Past Medical History:   Diagnosis Date    Abnormal Pap smear of cervix     Hypertension      Past Surgical History:   Procedure Laterality Date    BREAST BIOPSY Right     benign    COLONOSCOPY  2021    TUBAL ABDOMINAL LIGATION      WISDOM TOOTH EXTRACTION       Family History   Problem Relation Age of Onset    Hypertension Father     Coronary artery disease Father     Breast cancer Mother     Ovarian cancer Neg Hx     Uterine cancer Neg Hx     Colon cancer Neg Hx     Prostate cancer Neg Hx        Home Medications:  Prior to Admission medications    Medication Sig Start Date End Date Taking? Authorizing Provider   amoxicillin (AMOXIL) 500 MG capsule take 1 capsule by mouth three times daily until gone 5/22/24  Yes Moody Rodas MD   levothyroxine (SYNTHROID, LEVOTHROID) 75 MCG tablet Take 1 tablet by mouth Every Morning. 4/20/23   Moody Rodas MD   lisinopril (PRINIVIL,ZESTRIL) 5 MG tablet Take 1 tablet by mouth Daily. 11/3/23   Moody Rodas MD   Loratadine (Claritin) 10 MG capsule Take  by mouth.    Moody Rodas MD   PARoxetine (PAXIL) 10 MG tablet Take 1 tablet by mouth Daily. 4/20/23   Moody Rodas MD        Social History:   Social History     Tobacco Use    Smoking status: Never     Passive exposure: Never    Smokeless tobacco:  Never   Vaping Use    Vaping status: Some Days    Substances: Flavoring    Devices: Disposable   Substance Use Topics    Alcohol use: Never    Drug use: Defer         Review of Systems:  Review of Systems   I performed a 10 point review of systems which was all negative, except for the positives found in the HPI above.  Physical Exam:  /76   Pulse 84   Temp 98.1 °F (36.7 °C) (Oral)   Resp 16   SpO2 97%     Physical Exam     General: Awake alert and in no acute distress     HEENT: Head normocephalic atraumatic, eyes PERRLA EOMI, nose normal, oropharynx normal.     Neck: Supple full range of motion, no meningismus, no lymphadenopathy     Heart: Regular rate and rhythm, no murmurs or rubs, 2+ radial pulses bilaterally     Lungs: Clear to auscultation bilaterally without wheezes or crackles, no respiratory distress     Abdomen: Soft, suprapubic tenderness, nondistended, no rebound or guarding, no CVA tenderness     Back exam:  No L-spine tenderness.  No rash.     Skin: Warm, dry, no rash     Musculoskeletal: Normal range of motion, no lower extremity edema     Neurologic: Oriented x3, no motor deficits no sensory deficits     Psychiatric: Mood appears stable, no psychosis          Procedures:  Procedures      Medical Decision Making:      Comorbidities that affect care:    Hypertension    External Notes reviewed:          The following orders were placed and all results were independently analyzed by me:  Orders Placed This Encounter   Procedures    Urine Culture - Urine,    [place] Draw    Comprehensive Metabolic Panel    Lipase    Lactic Acid, Plasma    Urinalysis With Microscopic If Indicated (No Culture) - Urine, Clean Catch    CBC Auto Differential    Urinalysis, Microscopic Only - Urine, Clean Catch    Undress & Gown    CBC & Differential    Green Top (Gel)    Lavender Top    Gold Top - SST    Light Blue Top       Medications Given in the Emergency Department:  Medications   phenazopyridine (PYRIDIUM)  tablet 200 mg (200 mg Oral Given 5/29/24 0829)   cefTRIAXone (ROCEPHIN) 2,000 mg in sodium chloride 0.9 % 100 mL IVPB-VTB (0 mg Intravenous Stopped 5/29/24 1035)        ED Course:         Labs:    Lab Results (last 24 hours)       Procedure Component Value Units Date/Time    CBC & Differential [[id number]]  (Abnormal) Collected: 05/29/24 0804    Specimen: Blood Updated: 05/29/24 0815    Narrative:      The following orders were created for panel order CBC & Differential.  Procedure                               Abnormality         Status                     ---------                               -----------         ------                     CBC Auto Differential[[id number]]        Abnormal            Final result                 Please view results for these tests on the individual orders.    Comprehensive Metabolic Panel [[id number]]  (Abnormal) Collected: 05/29/24 0804    Specimen: Blood Updated: 05/29/24 0837     Glucose 134 mg/dL      BUN 12 mg/dL      Creatinine 0.98 mg/dL      Sodium 136 mmol/L      Potassium 4.1 mmol/L      Chloride 100 mmol/L      CO2 25.0 mmol/L      Calcium 9.9 mg/dL      Total Protein 7.8 g/dL      Albumin 4.7 g/dL      ALT (SGPT) 39 U/L      AST (SGOT) 23 U/L      Alkaline Phosphatase 72 U/L      Total Bilirubin 0.4 mg/dL      Globulin 3.1 gm/dL      A/G Ratio 1.5 g/dL      BUN/Creatinine Ratio 12.2     Anion Gap 11.0 mmol/L      eGFR 68.7 mL/min/1.73     Narrative:      GFR Normal >60  Chronic Kidney Disease <60  Kidney Failure <15      Lipase [[id number]]  (Normal) Collected: 05/29/24 0804    Specimen: Blood Updated: 05/29/24 0837     Lipase 52 U/L     Lactic Acid, Plasma [[id number]]  (Normal) Collected: 05/29/24 0804    Specimen: Blood Updated: 05/29/24 0832     Lactate 1.5 mmol/L     Urinalysis With Microscopic If Indicated (No Culture) - Urine, Clean Catch [[id number]]  (Abnormal) Collected: 05/29/24 0804    Specimen: Urine, Clean Catch Updated: 05/29/24 0826     Color, UA Yellow      Appearance, UA Cloudy     pH, UA 6.0     Specific Gravity, UA <=1.005     Glucose, UA Negative     Ketones, UA Negative     Bilirubin, UA Negative     Blood, UA Large (3+)     Protein, UA Trace     Leuk Esterase, UA Large (3+)     Nitrite, UA Positive     Urobilinogen, UA 0.2 E.U./dL    CBC Auto Differential [[id number]]  (Abnormal) Collected: 05/29/24 0804    Specimen: Blood Updated: 05/29/24 0815     WBC 14.19 10*3/mm3      RBC 4.59 10*6/mm3      Hemoglobin 14.1 g/dL      Hematocrit 42.0 %      MCV 91.5 fL      MCH 30.7 pg      MCHC 33.6 g/dL      RDW 12.3 %      RDW-SD 40.8 fl      MPV 9.2 fL      Platelets 264 10*3/mm3      Neutrophil % 73.1 %      Lymphocyte % 19.1 %      Monocyte % 5.8 %      Eosinophil % 1.1 %      Basophil % 0.6 %      Immature Grans % 0.3 %      Neutrophils, Absolute 10.38 10*3/mm3      Lymphocytes, Absolute 2.71 10*3/mm3      Monocytes, Absolute 0.83 10*3/mm3      Eosinophils, Absolute 0.15 10*3/mm3      Basophils, Absolute 0.08 10*3/mm3      Immature Grans, Absolute 0.04 10*3/mm3      nRBC 0.0 /100 WBC     Urinalysis, Microscopic Only - Urine, Clean Catch [[id number]]  (Abnormal) Collected: 05/29/24 0804    Specimen: Urine, Clean Catch Updated: 05/29/24 0826     RBC, UA 3-5 /HPF      WBC, UA Too Numerous to Count /HPF      Bacteria, UA 2+ /HPF      Squamous Epithelial Cells, UA 3-6 /HPF      Hyaline Casts, UA None Seen /LPF      Methodology Manual Light Microscopy    Urine Culture - Urine, Urine, Clean Catch [[id number]] Collected: 05/29/24 0804    Specimen: Urine, Clean Catch Updated: 05/29/24 0843             Imaging:    No Radiology Exams Resulted Within Past 24 Hours      Differential Diagnosis and Discussion:    Dysuria: Differential diagnosis includes but is not limited to urethritis, cystitis, pyelonephritis, ureteral calculi, neoplasm, chemical irritant, urethral stricture, and trauma    All labs were reviewed and interpreted by me.    MDM  Number of Diagnoses or Management  Options  Urinary tract infection with hematuria, site unspecified  Diagnosis management comments: The patient´s urinalysis and urinary complaints, signs, symptoms, and diagnostic testing is consistent with a urinary tract infection. Patient is resting comfortably, is alert, and is in no distress. The repeat examination is unremarkable and benign. The patient has no signs of urosepsis. The patient was started on antibiotics in the emergency department and will be discharged with antibiotics as an outpatient. The patient was counseled to return to the ER for fever >100.5, intractable pain or vomiting, or any other concerns that they may have. The patient has expressed a clear and thorough understanding and agreed to follow up as instructed.       Amount and/or Complexity of Data Reviewed  Clinical lab tests: reviewed and ordered    Risk of Complications, Morbidity, and/or Mortality  Presenting problems: low  Diagnostic procedures: minimal  Management options: minimal    Patient Progress  Patient progress: improved                 Patient Care Considerations:    SEPSIS was considered but is NOT present in the emergency department as SIRS criteria is not present.      Consultants/Shared Management Plan:        Social Determinants of Health:    Patient is independent, reliable, and has access to care.       Disposition and Care Coordination:    Discharged: The patient is suitable and stable for discharge with no need for consideration of admission.    I have explained discharge medications and the need for follow up with the patient/caretakers. This was also printed in the discharge instructions. Patient was discharged with the following medications and follow up:      Medication List        New Prescriptions      cephalexin 500 MG capsule  Commonly known as: KEFLEX  Take 1 capsule by mouth 4 (Four) Times a Day for 7 days.     ondansetron ODT 4 MG disintegrating tablet  Commonly known as: ZOFRAN-ODT  Place 1 tablet on  the tongue Every 8 (Eight) Hours As Needed for Nausea or Vomiting for up to 10 days.     phenazopyridine 200 MG tablet  Commonly known as: PYRIDIUM  Take 1 tablet by mouth 3 (Three) Times a Day As Needed for Bladder Spasms for up to 2 days.               Where to Get Your Medications        [facility] [address] [facility] [place] [facility] [address] [facility] - [phone]  - [phone]   [address] [place], [place] [address] [address]      Phone: [phone]   cephalexin 500 MG capsule  ondansetron ODT 4 MG disintegrating tablet  phenazopyridine 200 MG tablet      No follow-up provider specified.     Final diagnoses:   Urinary tract infection with hematuria, site unspecified        ED Disposition       ED Disposition   Discharge    Condition   Stable    Comment   Patient discharged.                This medical record created using voice recognition software.             Jennifer Damon, APRN  05/29/24 4706

## 2024-05-29 NOTE — DISCHARGE INSTRUCTIONS
Your lab work today was consistent with urinary tract infection.  We gave you first dose of antibiotic here in the ED.  Please fill your prescription and take as prescribed.  Drink plenty of fluids.  Take Zofran as needed for nausea.  Follow-up with your PCP next week for recheck.    You can stop the amoxicillin.    Return for worsening symptoms such as fever greater 100.5, intractable vomiting, flank pain, any new concerns.

## 2024-05-31 LAB — BACTERIA SPEC AEROBE CULT: ABNORMAL

## 2024-12-05 ENCOUNTER — TRANSCRIBE ORDERS (OUTPATIENT)
Dept: ADMINISTRATIVE | Facility: HOSPITAL | Age: 54
End: 2024-12-05
Payer: COMMERCIAL

## 2024-12-05 DIAGNOSIS — Z12.31 SCREENING MAMMOGRAM, ENCOUNTER FOR: Primary | ICD-10-CM

## 2025-07-03 ENCOUNTER — TRANSCRIBE ORDERS (OUTPATIENT)
Dept: ADMINISTRATIVE | Facility: HOSPITAL | Age: 55
End: 2025-07-03
Payer: COMMERCIAL

## 2025-07-03 DIAGNOSIS — R93.89 ENDOMETRIAL THICKENING ON ULTRASOUND: Primary | ICD-10-CM
